# Patient Record
Sex: FEMALE | Race: BLACK OR AFRICAN AMERICAN | NOT HISPANIC OR LATINO
[De-identification: names, ages, dates, MRNs, and addresses within clinical notes are randomized per-mention and may not be internally consistent; named-entity substitution may affect disease eponyms.]

---

## 2019-03-29 ENCOUNTER — APPOINTMENT (OUTPATIENT)
Dept: DERMATOLOGY | Facility: CLINIC | Age: 34
End: 2019-03-29
Payer: COMMERCIAL

## 2019-03-29 DIAGNOSIS — Z91.89 OTHER SPECIFIED PERSONAL RISK FACTORS, NOT ELSEWHERE CLASSIFIED: ICD-10-CM

## 2019-03-29 DIAGNOSIS — L83 ACANTHOSIS NIGRICANS: ICD-10-CM

## 2019-03-29 PROCEDURE — 99203 OFFICE O/P NEW LOW 30 MIN: CPT

## 2019-03-29 RX ORDER — MINOCYCLINE HYDROCHLORIDE 100 MG/1
100 CAPSULE ORAL TWICE DAILY
Qty: 60 | Refills: 1 | Status: ACTIVE | COMMUNITY
Start: 2019-03-29 | End: 1900-01-01

## 2019-03-29 RX ORDER — KETOCONAZOLE 20.5 MG/ML
2 SHAMPOO, SUSPENSION TOPICAL
Qty: 1 | Refills: 11 | Status: ACTIVE | COMMUNITY
Start: 2019-03-29 | End: 1900-01-01

## 2019-05-30 ENCOUNTER — APPOINTMENT (OUTPATIENT)
Dept: DERMATOLOGY | Facility: CLINIC | Age: 34
End: 2019-05-30

## 2020-01-02 ENCOUNTER — EMERGENCY (EMERGENCY)
Facility: HOSPITAL | Age: 35
LOS: 0 days | Discharge: HOME | End: 2020-01-02
Payer: COMMERCIAL

## 2020-01-02 VITALS
WEIGHT: 145.06 LBS | RESPIRATION RATE: 18 BRPM | HEIGHT: 62 IN | SYSTOLIC BLOOD PRESSURE: 140 MMHG | OXYGEN SATURATION: 99 % | TEMPERATURE: 100 F | DIASTOLIC BLOOD PRESSURE: 88 MMHG | HEART RATE: 92 BPM

## 2020-01-02 DIAGNOSIS — J06.9 ACUTE UPPER RESPIRATORY INFECTION, UNSPECIFIED: ICD-10-CM

## 2020-01-02 DIAGNOSIS — R05 COUGH: ICD-10-CM

## 2020-01-02 PROCEDURE — 99283 EMERGENCY DEPT VISIT LOW MDM: CPT

## 2020-01-02 PROCEDURE — 71046 X-RAY EXAM CHEST 2 VIEWS: CPT | Mod: 26

## 2020-01-02 RX ORDER — ACETAMINOPHEN 500 MG
975 TABLET ORAL ONCE
Refills: 0 | Status: COMPLETED | OUTPATIENT
Start: 2020-01-02 | End: 2020-01-02

## 2020-01-02 RX ADMIN — Medication 975 MILLIGRAM(S): at 13:45

## 2020-01-02 NOTE — ED PROVIDER NOTE - CLINICAL SUMMARY MEDICAL DECISION MAKING FREE TEXT BOX
35 y/o F with flu like symptoms recently diagnosed with flu positive at an urgent care, treated but never had a CXR done as she would want, pt stated that cough improved a little but feels like its been there too long, no associated symptoms, NAD, VS low grade temp of 99.9 treated with tylenol, CXR negative pt was dc home with instructions to f/u with PMD.

## 2020-01-02 NOTE — ED PROVIDER NOTE - PATIENT PORTAL LINK FT
You can access the FollowMyHealth Patient Portal offered by [facility] by registering at the following website: [email or web address]. By joining [employer]’s FollowMyHealth portal, you will also be able to view your health information using other applications (apps) compatible with our system.

## 2020-01-02 NOTE — ED PROVIDER NOTE - OBJECTIVE STATEMENT
Pt is a 34 year old female w/no pertinent PMHx who presents to the ED today for bad cough, occasionally productive w/yellowish sputum, for about x1 week. Pt reports she was diagnosed w/the flu x10 days ago by an Urgent Care. Pt reports cough was present when she was diagnosed w/flu and states she did not have chest x-ray. Denies fever, HA, N/V/D, CP or SOB. No allergies to medications. Patient denies EtOH/tobacco/illicit substance use.

## 2020-01-02 NOTE — ED ADULT NURSE NOTE - OBJECTIVE STATEMENT
pt a&O x3 complaining of cough times 1 weeks. states she was diagnosed with flu and started on medications. pt denies N/V, body aches, congestion, fever pt states previous flu symptoms resolved but still has cough. denies any medical history.

## 2020-01-02 NOTE — ED ADULT TRIAGE NOTE - CHIEF COMPLAINT QUOTE
pt complaining of cough times 1 weeks. states she was diagnosed with flu and started on medications. other symptoms resolved but still has cough. denies any medical history.

## 2020-07-15 ENCOUNTER — APPOINTMENT (OUTPATIENT)
Dept: DERMATOLOGY | Facility: CLINIC | Age: 35
End: 2020-07-15

## 2021-02-11 ENCOUNTER — APPOINTMENT (OUTPATIENT)
Dept: HUMAN REPRODUCTION | Facility: CLINIC | Age: 36
End: 2021-02-11
Payer: COMMERCIAL

## 2021-02-11 PROCEDURE — 99072 ADDL SUPL MATRL&STAF TM PHE: CPT

## 2021-02-11 PROCEDURE — 76830 TRANSVAGINAL US NON-OB: CPT

## 2021-02-11 PROCEDURE — 36415 COLL VENOUS BLD VENIPUNCTURE: CPT

## 2021-02-11 PROCEDURE — 99214 OFFICE O/P EST MOD 30 MIN: CPT

## 2021-03-11 ENCOUNTER — APPOINTMENT (OUTPATIENT)
Dept: HUMAN REPRODUCTION | Facility: CLINIC | Age: 36
End: 2021-03-11

## 2021-03-13 ENCOUNTER — EMERGENCY (EMERGENCY)
Facility: HOSPITAL | Age: 36
LOS: 0 days | Discharge: HOME | End: 2021-03-13
Attending: STUDENT IN AN ORGANIZED HEALTH CARE EDUCATION/TRAINING PROGRAM
Payer: COMMERCIAL

## 2021-03-13 VITALS
TEMPERATURE: 98 F | OXYGEN SATURATION: 100 % | WEIGHT: 130.07 LBS | HEART RATE: 107 BPM | RESPIRATION RATE: 16 BRPM | HEIGHT: 62 IN

## 2021-03-13 VITALS
SYSTOLIC BLOOD PRESSURE: 110 MMHG | OXYGEN SATURATION: 99 % | DIASTOLIC BLOOD PRESSURE: 58 MMHG | TEMPERATURE: 98 F | HEART RATE: 78 BPM | RESPIRATION RATE: 19 BRPM
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
## 2021-03-13 DIAGNOSIS — M25.572 PAIN IN LEFT ANKLE AND JOINTS OF LEFT FOOT: ICD-10-CM

## 2021-03-13 DIAGNOSIS — S82.892A OTHER FRACTURE OF LEFT LOWER LEG, INITIAL ENCOUNTER FOR CLOSED FRACTURE: ICD-10-CM
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
## 2021-03-13 PROCEDURE — 99284 EMERGENCY DEPT VISIT MOD MDM: CPT

## 2021-03-13 PROCEDURE — 73590 X-RAY EXAM OF LOWER LEG: CPT | Mod: 26,LT,76

## 2021-03-13 PROCEDURE — 73610 X-RAY EXAM OF ANKLE: CPT | Mod: 26,LT

## 2021-03-13 RX ORDER — ACETAMINOPHEN 500 MG
975 TABLET ORAL ONCE
Refills: 0 | Status: COMPLETED | OUTPATIENT
Start: 2021-03-13 | End: 2021-03-13

## 2021-03-13 RX ORDER — OXYCODONE HYDROCHLORIDE 5 MG/1
5 TABLET ORAL ONCE
Refills: 0 | Status: DISCONTINUED | OUTPATIENT
Start: 2021-03-13 | End: 2021-03-13

## 2021-03-13 RX ADMIN — OXYCODONE HYDROCHLORIDE 5 MILLIGRAM(S): 5 TABLET ORAL at 05:55

## 2021-03-13 RX ADMIN — OXYCODONE HYDROCHLORIDE 5 MILLIGRAM(S): 5 TABLET ORAL at 06:30

## 2021-03-13 RX ADMIN — Medication 975 MILLIGRAM(S): at 05:55

## 2021-03-13 RX ADMIN — Medication 975 MILLIGRAM(S): at 05:25

## 2021-03-13 NOTE — ED PROVIDER NOTE - OBJECTIVE STATEMENT
35F denies pmhx says she was descending her staircase at home to pickup food delivery when she tripped, falling awkwardly onto her L ankle. Denies head trauma and LOC. Endorses acute 10/10 L ankle pain and swelling since the fall. Denies decreased sensation distal to the injury. Has not taken anything for the pain pta.

## 2021-03-13 NOTE — CONSULT NOTE ADULT - SUBJECTIVE AND OBJECTIVE BOX
Patient is a 35yFemale community ambulator without assistive devices who presents to [facility] ED w/ a c/o of Left ankle pain s/p fall at home. Patient states she was going down the stairs and missed the last step, twisted ankle and fell , denies any preceding CP/SOB/palpitations/headache/confusion/dizziness/weakness/fatigue. Denies Head trauma/LOC. States inability to walk immediately following the injury. Denies any numbness or tingling. Denies having any other pain elsewhere. Denies any previous orthopedic history. No other orthopedic concerns at this time.    No pertinent past medical history            No Known Allergies      PHYSICAL EXAM:  T(C): 36.8 (03-13-21 @ 05:03), Max: 36.8 (03-13-21 @ 05:03)  HR: 107 (03-13-21 @ 05:03) (107 - 107)  BP: 99/65 (03-13-21 @ 05:07) (99/65 - 99/65)  RR: 16 (03-13-21 @ 05:03) (16 - 16)  SpO2: 100% (03-13-21 @ 05:03) (100% - 100%)    Gen: NAD, Resting comfortably    LLE:  Skin intact, moderate swelling about the ankle, no erythema or ecchymosis  +bony tenderness to palpation over medial and lateral mall  +EHL/FHL/TA/GSC  +SILT L3-S1  + DP  Compartments soft and compressible  No calf tenderness    Secondary Survey:   No TTP over bony prominences, SILT, palpable pulses, full/painless A/PROM, compartments soft. No TTP over spinous processes or paraspinal muscles at C/T/L spine. No palpable step off. No other injuries or complaints.      Procedure:  Under aseptic conditions, a hematoma block was administered to the fracture site using 10cc of 1% lidocaine. Closed reduction was performed and a well molded, well padded plaster splint was applied. The patient tolerated the procedure well and there we no complications. The patient's post-reduction neurovascular exam was unchanged. Post-reduction xrays demonstrated acceptable alignment.        A/P: 35F w/ L [place] equivalent ankle fracture    Case discussed and reviewed w/ Dr Gardiner  Images showed L lateral mall fx and medial clear space widening on original imaging. No stress views needed  Ankle was reduced and placed in a trilam splint and post reduction images were done.   Signs and symptoms of compartment syndrome were explained to the patient  Pt understand to seek care at ED if encountered.   No immediate surgical intervention needed but pt aware they may need surgical intervention  Analgesia prn  NWB LLE in trilam splint  DVT ppx   PT/OT as tolerated, further recs in the office  Ice and elevate as tolerated  Orthopedically stable for discharge w/ plan to FU outpatient w/ Dr Gardiner, call office for apt.   Will discuss/Discussed with attending who is in agreement with above plan

## 2021-03-13 NOTE — ED PROVIDER NOTE - PATIENT PORTAL LINK FT
You can access the FollowMyHealth Patient Portal offered by [facility] by registering at the following website: [email or web address]. By joining [employer]’s FollowMyHealth portal, you will also be able to view your health information using other applications (apps) compatible with our system.

## 2021-03-13 NOTE — ED ADULT NURSE NOTE - NSIMPLEMENTINTERV_GEN_ALL_ED
Implemented All Fall Risk Interventions:  [place] to call system. Call bell, personal items and telephone within reach. Instruct patient to call for assistance. Room bathroom lighting operational. Non-slip footwear when patient is off stretcher. Physically safe environment: no spills, clutter or unnecessary equipment. Stretcher in lowest position, wheels locked, appropriate side rails in place. Provide visual cue, wrist band, yellow gown, etc. Monitor gait and stability. Monitor for mental status changes and reorient to person, place, and time. Review medications for side effects contributing to fall risk. Reinforce activity limits and safety measures with patient and family.

## 2021-03-13 NOTE — ED PROVIDER NOTE - WET READ LAUNCH FT
There are no Wet Read(s) to document. There are 2 Wet Read(s) to document. There are 4 Wet Read(s) to document.

## 2021-03-13 NOTE — ED PROVIDER NOTE - PROGRESS NOTE DETAILS
Pt was signed out to me by Dr. Livingston, awaiting ortho.  Ortho resident came to bedside, reduced and splinted, awaiting call back from ortho team with plan Ortho called back, states patient can be discharged to follow up with Dr. Gardiner outpatient within 5 days,.

## 2021-03-13 NOTE — ED PROVIDER NOTE - NSFOLLOWUPINSTRUCTIONS_ED_ALL_ED_FT
Please return to Emergency Department immediately for any new, concerning, or worsening symptoms.   Please follow-up with Orthopedics as recommended within 5 days

## 2021-03-13 NOTE — ED PROVIDER NOTE - CARE PROVIDER_API CALL
Matheus Gardiner (DO)  Orthopaedic Surgery  [address] [place] [address]  Phone: [phone]  Fax: [phone]  Follow Up Time: 1-3 Days

## 2021-03-13 NOTE — ED ADULT NURSE NOTE - OBJECTIVE STATEMENT
pt c/o L-ankle pain, s/p twisting it on steps x 30 minutes ago pt presents to the ED c/o left ankle pain, s/p walking down her staircase at home and twisting it going down the steps and landing on left ankle approx. 30 minutes ago. Pt denies head trauma, denies LOC. no motor/sensory deficits noted. swelling noted to left ankle.

## 2021-03-13 NOTE — ED PROVIDER NOTE - MUSCULOSKELETAL MINIMAL EXAM
L ankle, swollen, tender, sensation intact in foot distally, foot appears well perfused, DP and posterior tibial pulses palpated, seem 2+/RANGE OF MOTION LIMITED

## 2021-03-13 NOTE — ED PROVIDER NOTE - CLINICAL SUMMARY MEDICAL DECISION MAKING FREE TEXT BOX
35F no pmhx p/f acute L ankle pain in setting of mechanical slip&fall, L ankle circumferentially swollen, tender to palpation over medial and lateral malleoli, will obtain xrays and give pain meds, ddx sprain vs fracture, will involve ortho if bony injury present on xray.

## 2021-03-17 ENCOUNTER — OUTPATIENT (OUTPATIENT)
Dept: OUTPATIENT SERVICES | Facility: HOSPITAL | Age: 36
LOS: 1 days | End: 2021-03-17
Payer: COMMERCIAL

## 2021-03-17 VITALS
HEART RATE: 87 BPM | OXYGEN SATURATION: 98 % | DIASTOLIC BLOOD PRESSURE: 73 MMHG | SYSTOLIC BLOOD PRESSURE: 113 MMHG | HEIGHT: 61.5 IN | TEMPERATURE: 99 F | WEIGHT: 149.91 LBS | RESPIRATION RATE: 16 BRPM

## 2021-03-17 DIAGNOSIS — S82.62XA DISPLACED FRACTURE OF LATERAL MALLEOLUS OF LEFT FIBULA, INITIAL ENCOUNTER FOR CLOSED FRACTURE: ICD-10-CM

## 2021-03-17 DIAGNOSIS — Z01.818 ENCOUNTER FOR OTHER PREPROCEDURAL EXAMINATION: ICD-10-CM

## 2021-03-17 DIAGNOSIS — Z20.828 CONTACT WITH AND (SUSPECTED) EXPOSURE TO OTHER VIRAL COMMUNICABLE DISEASES: ICD-10-CM

## 2021-03-17 LAB
HCG SERPL-ACNC: <1 MIU/ML — SIGNIFICANT CHANGE UP
HCT VFR BLD CALC: 38.8 % — SIGNIFICANT CHANGE UP (ref 34.5–45)
HGB BLD-MCNC: 12.1 G/DL — SIGNIFICANT CHANGE UP (ref 11.5–15.5)
MCHC RBC-ENTMCNC: 29 PG — SIGNIFICANT CHANGE UP (ref 27–34)
MCHC RBC-ENTMCNC: 31.2 GM/DL — LOW (ref 32–36)
MCV RBC AUTO: 93 FL — SIGNIFICANT CHANGE UP (ref 80–100)
NRBC # BLD: 0 /100 WBCS — SIGNIFICANT CHANGE UP (ref 0–0)
PLATELET # BLD AUTO: 411 K/UL — HIGH (ref 150–400)
RBC # BLD: 4.17 M/UL — SIGNIFICANT CHANGE UP (ref 3.8–5.2)
RBC # FLD: 12.9 % — SIGNIFICANT CHANGE UP (ref 10.3–14.5)
SARS-COV-2 RNA SPEC QL NAA+PROBE: SIGNIFICANT CHANGE UP
WBC # BLD: 12.41 K/UL — HIGH (ref 3.8–10.5)
WBC # FLD AUTO: 12.41 K/UL — HIGH (ref 3.8–10.5)

## 2021-03-17 PROCEDURE — 84702 CHORIONIC GONADOTROPIN TEST: CPT

## 2021-03-17 PROCEDURE — G0463: CPT

## 2021-03-17 PROCEDURE — 85027 COMPLETE CBC AUTOMATED: CPT

## 2021-03-17 PROCEDURE — U0005: CPT

## 2021-03-17 PROCEDURE — U0003: CPT

## 2021-03-17 PROCEDURE — 36415 COLL VENOUS BLD VENIPUNCTURE: CPT

## 2021-03-17 NOTE — H&P PST ADULT - HISTORY OF PRESENT ILLNESS
34yo female patient who states that she sustained trauma to her LLE when she fell down steps in her home. She went to the ER at [facility] and an x-ray revealed a fracture. She was referred to Ortho and surgery was scheduled. She presents today for PSTs. She is having pain which she rates at 9/10. She is taking Ibuprofen and Tylenol with inadequate relief.

## 2021-03-17 NOTE — H&P PST ADULT - NSICDXPROBLEM_GEN_ALL_CORE_FT
PROBLEM DIAGNOSES  Problem: Closed displaced fracture of lateral malleolus of left fibula  Assessment and Plan: LEFT Open Reduction Internal Fixation Distal Fibular Fracture/Open Reduction Internal Fixation Distal Tibiofibular Disruption on 03/19/2021

## 2021-03-17 NOTE — H&P PST ADULT - ASSESSMENT
34yo female patient scheduled for surgery on 03/19/21. She will be NPO as per Anesthesia and will take no medication on AM of surgery. All pre-op instructions reviewed with patient. As per Covid19 Protocol, she will be screened for Covid19 today.

## 2021-03-18 ENCOUNTER — TRANSCRIPTION ENCOUNTER (OUTPATIENT)
Age: 36
End: 2021-03-18

## 2021-03-19 ENCOUNTER — RESULT REVIEW (OUTPATIENT)
Age: 36
End: 2021-03-19

## 2021-03-19 ENCOUNTER — OUTPATIENT (OUTPATIENT)
Dept: OUTPATIENT SERVICES | Facility: HOSPITAL | Age: 36
LOS: 1 days | End: 2021-03-19
Payer: COMMERCIAL

## 2021-03-19 VITALS
RESPIRATION RATE: 21 BRPM | DIASTOLIC BLOOD PRESSURE: 71 MMHG | OXYGEN SATURATION: 98 % | TEMPERATURE: 98 F | HEART RATE: 90 BPM | SYSTOLIC BLOOD PRESSURE: 121 MMHG | WEIGHT: 197.31 LBS | HEIGHT: 62 IN

## 2021-03-19 VITALS
OXYGEN SATURATION: 100 % | TEMPERATURE: 99 F | RESPIRATION RATE: 16 BRPM | DIASTOLIC BLOOD PRESSURE: 55 MMHG | HEART RATE: 95 BPM | SYSTOLIC BLOOD PRESSURE: 110 MMHG

## 2021-03-19 DIAGNOSIS — Z01.818 ENCOUNTER FOR OTHER PREPROCEDURAL EXAMINATION: ICD-10-CM

## 2021-03-19 DIAGNOSIS — S82.62XA DISPLACED FRACTURE OF LATERAL MALLEOLUS OF LEFT FIBULA, INITIAL ENCOUNTER FOR CLOSED FRACTURE: ICD-10-CM

## 2021-03-19 DIAGNOSIS — Z20.828 CONTACT WITH AND (SUSPECTED) EXPOSURE TO OTHER VIRAL COMMUNICABLE DISEASES: ICD-10-CM

## 2021-03-19 LAB — HCG SERPL-ACNC: <1 MIU/ML — SIGNIFICANT CHANGE UP

## 2021-03-19 PROCEDURE — 84702 CHORIONIC GONADOTROPIN TEST: CPT

## 2021-03-19 PROCEDURE — 88311 DECALCIFY TISSUE: CPT

## 2021-03-19 PROCEDURE — C1713: CPT

## 2021-03-19 PROCEDURE — 36415 COLL VENOUS BLD VENIPUNCTURE: CPT

## 2021-03-19 PROCEDURE — 27829 TREAT LOWER LEG JOINT: CPT | Mod: LT

## 2021-03-19 PROCEDURE — 88304 TISSUE EXAM BY PATHOLOGIST: CPT | Mod: 26

## 2021-03-19 PROCEDURE — 88311 DECALCIFY TISSUE: CPT | Mod: 26

## 2021-03-19 PROCEDURE — 88304 TISSUE EXAM BY PATHOLOGIST: CPT

## 2021-03-19 PROCEDURE — 76000 FLUOROSCOPY <1 HR PHYS/QHP: CPT

## 2021-03-19 PROCEDURE — 27792 TREATMENT OF ANKLE FRACTURE: CPT | Mod: LT

## 2021-03-19 RX ORDER — OXYCODONE HYDROCHLORIDE 5 MG/1
5 TABLET ORAL ONCE
Refills: 0 | Status: DISCONTINUED | OUTPATIENT
Start: 2021-03-19 | End: 2021-03-22

## 2021-03-19 RX ORDER — SODIUM CHLORIDE 9 MG/ML
1000 INJECTION, SOLUTION INTRAVENOUS
Refills: 0 | Status: DISCONTINUED | OUTPATIENT
Start: 2021-03-19 | End: 2021-03-22

## 2021-03-19 RX ORDER — ONDANSETRON 8 MG/1
4 TABLET, FILM COATED ORAL ONCE
Refills: 0 | Status: DISCONTINUED | OUTPATIENT
Start: 2021-03-19 | End: 2021-03-22

## 2021-03-19 RX ORDER — HYDROMORPHONE HYDROCHLORIDE 2 MG/ML
0.5 INJECTION INTRAMUSCULAR; INTRAVENOUS; SUBCUTANEOUS
Refills: 0 | Status: DISCONTINUED | OUTPATIENT
Start: 2021-03-19 | End: 2021-03-22

## 2021-03-19 RX ORDER — CEFAZOLIN SODIUM 1 G
2000 VIAL (EA) INJECTION ONCE
Refills: 0 | Status: COMPLETED | OUTPATIENT
Start: 2021-03-19 | End: 2021-03-19

## 2021-03-19 RX ADMIN — SODIUM CHLORIDE 75 MILLILITER(S): 9 INJECTION, SOLUTION INTRAVENOUS at 16:00

## 2021-03-19 NOTE — ASU DISCHARGE PLAN (ADULT/PEDIATRIC) - ASU DC SPECIAL INSTRUCTIONSFT
Elevate left lower extremity while in bed.  Non weight beaing left leg ambulate with crutches.  Keep surgical site covered and dry when showering.  Pain medication as prescribed.  Aspirin 325mg daily for blood clot preventiion and antibiotic as prescribed.  Follow up with Dr. Siegel in 2 weeks    For Constipation :   • Increase your water intake. Drink at least 8 glasses of water daily.  • Try adding fiber to your diet by eating fruits, vegetables and foods that are rich in grains.  • If you do experience constipation, you may take an over-the-counter stool softener/laxative such as Lakesha Colace, Senekot or  Milk of Magnesia. Elevate left lower extremity while in bed and for pain and swelling.  Non weight beaing left leg ambulate with crutches.  Keep surgical site covered and dry when showering.  Pain medication as prescribed.  Aspirin 325mg daily for blood clot preventiion and antibiotic as prescribed.  Follow up with Dr. Siegel in 2 weeks    For Constipation :   • Increase your water intake. Drink at least 8 glasses of water daily.  • Try adding fiber to your diet by eating fruits, vegetables and foods that are rich in grains.  • If you do experience constipation, you may take an over-the-counter stool softener/laxative such as Lakesha Colace, Senekot or  Milk of Magnesia.

## 2021-03-19 NOTE — ASU DISCHARGE PLAN (ADULT/PEDIATRIC) - CALL YOUR DOCTOR IF YOU HAVE ANY OF THE FOLLOWING:
Bleeding that does not stop/Swelling that gets worse/Pain not relieved by Medications/Fever greater than (need to indicate Fahrenheit or Celsius) Bleeding that does not stop/Swelling that gets worse/Pain not relieved by Medications/Fever greater than (need to indicate Fahrenheit or Celsius)/Wound/Surgical Site with redness, or foul smelling discharge or pus/Numbness, tingling, color or temperature change to extremity

## 2021-03-19 NOTE — ASU DISCHARGE PLAN (ADULT/PEDIATRIC) - ACTIVITY LEVEL
No excercise/No heavy lifting/No sports/gym/Elevate extremity/No tub baths No excercise/No heavy lifting/No sports/gym/No weight bearing/Elevate extremity/No tub baths

## 2021-03-19 NOTE — ASU DISCHARGE PLAN (ADULT/PEDIATRIC) - CARE PROVIDER_API CALL
Josh Siegel)  Orthopaedic Surgery  [address] [place] [address]  Phone: [phone]  Fax: [phone]  Follow Up Time: 2 weeks

## 2021-03-30 ENCOUNTER — APPOINTMENT (OUTPATIENT)
Dept: HUMAN REPRODUCTION | Facility: CLINIC | Age: 36
End: 2021-03-30

## 2021-04-02 PROBLEM — E55.9 VITAMIN D DEFICIENCY, UNSPECIFIED: Chronic | Status: ACTIVE | Noted: 2021-03-17

## 2021-04-09 ENCOUNTER — APPOINTMENT (OUTPATIENT)
Dept: HUMAN REPRODUCTION | Facility: CLINIC | Age: 36
End: 2021-04-09
Payer: COMMERCIAL

## 2021-04-09 ENCOUNTER — APPOINTMENT (OUTPATIENT)
Dept: RADIOLOGY | Facility: HOSPITAL | Age: 36
End: 2021-04-09

## 2021-04-09 ENCOUNTER — OUTPATIENT (OUTPATIENT)
Dept: OUTPATIENT SERVICES | Facility: HOSPITAL | Age: 36
LOS: 1 days | End: 2021-04-09
Payer: COMMERCIAL

## 2021-04-09 ENCOUNTER — RESULT REVIEW (OUTPATIENT)
Age: 36
End: 2021-04-09

## 2021-04-09 DIAGNOSIS — N97.1 FEMALE INFERTILITY OF TUBAL ORIGIN: ICD-10-CM

## 2021-04-09 PROCEDURE — 99214 OFFICE O/P EST MOD 30 MIN: CPT | Mod: 25

## 2021-04-09 PROCEDURE — 58340 CATHETER FOR HYSTEROGRAPHY: CPT

## 2021-04-09 PROCEDURE — 74740 X-RAY FEMALE GENITAL TRACT: CPT | Mod: 26,59

## 2021-04-09 PROCEDURE — 74740 X-RAY FEMALE GENITAL TRACT: CPT

## 2021-06-30 ENCOUNTER — ASOB RESULT (OUTPATIENT)
Age: 36
End: 2021-06-30

## 2021-06-30 ENCOUNTER — APPOINTMENT (OUTPATIENT)
Dept: ANTEPARTUM | Facility: CLINIC | Age: 36
End: 2021-06-30
Payer: COMMERCIAL

## 2021-06-30 PROCEDURE — 99072 ADDL SUPL MATRL&STAF TM PHE: CPT

## 2021-06-30 PROCEDURE — 36416 COLLJ CAPILLARY BLOOD SPEC: CPT

## 2021-06-30 PROCEDURE — 76801 OB US < 14 WKS SINGLE FETUS: CPT

## 2021-06-30 PROCEDURE — 76813 OB US NUCHAL MEAS 1 GEST: CPT

## 2021-07-20 NOTE — ED PROVIDER NOTE - SKIN, MLM
TPN bag changed, NPO w/ 1 cup of ice Q8H (1/2 cup being given Q4H), RUE precautions, voiding freely, states pain is 6/10, using Dilaudid PCA and scheduled Ofirmev for pain mgmt.  NGT to LIS putting out green/bile drainage, denies any n/v, abdomen is soft/di Skin normal color for race, warm, dry and intact. No evidence of rash.

## 2021-08-20 ENCOUNTER — ASOB RESULT (OUTPATIENT)
Age: 36
End: 2021-08-20

## 2021-08-20 ENCOUNTER — APPOINTMENT (OUTPATIENT)
Dept: ANTEPARTUM | Facility: CLINIC | Age: 36
End: 2021-08-20
Payer: COMMERCIAL

## 2021-08-20 PROCEDURE — 76811 OB US DETAILED SNGL FETUS: CPT

## 2021-12-15 ENCOUNTER — ASOB RESULT (OUTPATIENT)
Age: 36
End: 2021-12-15

## 2021-12-15 ENCOUNTER — INPATIENT (INPATIENT)
Facility: HOSPITAL | Age: 36
LOS: 2 days | Discharge: HOME | End: 2021-12-18
Attending: OBSTETRICS & GYNECOLOGY | Admitting: OBSTETRICS & GYNECOLOGY
Payer: COMMERCIAL

## 2021-12-15 ENCOUNTER — APPOINTMENT (OUTPATIENT)
Dept: ANTEPARTUM | Facility: CLINIC | Age: 36
End: 2021-12-15
Payer: COMMERCIAL

## 2021-12-15 VITALS — DIASTOLIC BLOOD PRESSURE: 72 MMHG | SYSTOLIC BLOOD PRESSURE: 109 MMHG | TEMPERATURE: 99 F | HEART RATE: 88 BPM

## 2021-12-15 DIAGNOSIS — Z98.890 OTHER SPECIFIED POSTPROCEDURAL STATES: Chronic | ICD-10-CM

## 2021-12-15 DIAGNOSIS — O36.5990 MATERNAL CARE FOR OTHER KNOWN OR SUSPECTED POOR FETAL GROWTH, UNSPECIFIED TRIMESTER, NOT APPLICABLE OR UNSPECIFIED: ICD-10-CM

## 2021-12-15 LAB
BASOPHILS # BLD AUTO: 0.03 K/UL — SIGNIFICANT CHANGE UP (ref 0–0.2)
BASOPHILS NFR BLD AUTO: 0.3 % — SIGNIFICANT CHANGE UP (ref 0–2)
EOSINOPHIL # BLD AUTO: 0.14 K/UL — SIGNIFICANT CHANGE UP (ref 0–0.5)
EOSINOPHIL NFR BLD AUTO: 1.2 % — SIGNIFICANT CHANGE UP (ref 0–6)
HCT VFR BLD CALC: 38.2 % — SIGNIFICANT CHANGE UP (ref 34.5–45)
HGB BLD-MCNC: 11.7 G/DL — SIGNIFICANT CHANGE UP (ref 11.5–15.5)
IANC: 7.77 K/UL — SIGNIFICANT CHANGE UP (ref 1.5–8.5)
IMM GRANULOCYTES NFR BLD AUTO: 1.1 % — SIGNIFICANT CHANGE UP (ref 0–1.5)
LYMPHOCYTES # BLD AUTO: 2.34 K/UL — SIGNIFICANT CHANGE UP (ref 1–3.3)
LYMPHOCYTES # BLD AUTO: 20.8 % — SIGNIFICANT CHANGE UP (ref 13–44)
MCHC RBC-ENTMCNC: 28.3 PG — SIGNIFICANT CHANGE UP (ref 27–34)
MCHC RBC-ENTMCNC: 30.6 GM/DL — LOW (ref 32–36)
MCV RBC AUTO: 92.5 FL — SIGNIFICANT CHANGE UP (ref 80–100)
MONOCYTES # BLD AUTO: 0.86 K/UL — SIGNIFICANT CHANGE UP (ref 0–0.9)
MONOCYTES NFR BLD AUTO: 7.6 % — SIGNIFICANT CHANGE UP (ref 2–14)
NEUTROPHILS # BLD AUTO: 7.77 K/UL — HIGH (ref 1.8–7.4)
NEUTROPHILS NFR BLD AUTO: 69 % — SIGNIFICANT CHANGE UP (ref 43–77)
NRBC # BLD: 0 /100 WBCS — SIGNIFICANT CHANGE UP
NRBC # FLD: 0 K/UL — SIGNIFICANT CHANGE UP
PLATELET # BLD AUTO: 280 K/UL — SIGNIFICANT CHANGE UP (ref 150–400)
RBC # BLD: 4.13 M/UL — SIGNIFICANT CHANGE UP (ref 3.8–5.2)
RBC # FLD: 12.9 % — SIGNIFICANT CHANGE UP (ref 10.3–14.5)
WBC # BLD: 11.26 K/UL — HIGH (ref 3.8–10.5)
WBC # FLD AUTO: 11.26 K/UL — HIGH (ref 3.8–10.5)

## 2021-12-15 PROCEDURE — 76816 OB US FOLLOW-UP PER FETUS: CPT

## 2021-12-15 PROCEDURE — 99212 OFFICE O/P EST SF 10 MIN: CPT | Mod: 25

## 2021-12-15 PROCEDURE — 76821 MIDDLE CEREBRAL ARTERY ECHO: CPT

## 2021-12-15 PROCEDURE — 76828 ECHO EXAM OF FETAL HEART: CPT

## 2021-12-15 PROCEDURE — 76820 UMBILICAL ARTERY ECHO: CPT

## 2021-12-15 PROCEDURE — 76818 FETAL BIOPHYS PROFILE W/NST: CPT | Mod: 26

## 2021-12-15 RX ORDER — CITRIC ACID/SODIUM CITRATE 300-500 MG
15 SOLUTION, ORAL ORAL EVERY 6 HOURS
Refills: 0 | Status: DISCONTINUED | OUTPATIENT
Start: 2021-12-15 | End: 2021-12-16

## 2021-12-15 RX ORDER — OXYTOCIN 10 UNIT/ML
333.33 VIAL (ML) INJECTION
Qty: 20 | Refills: 0 | Status: DISCONTINUED | OUTPATIENT
Start: 2021-12-15 | End: 2021-12-17

## 2021-12-15 RX ORDER — SODIUM CHLORIDE 9 MG/ML
1000 INJECTION, SOLUTION INTRAVENOUS
Refills: 0 | Status: DISCONTINUED | OUTPATIENT
Start: 2021-12-15 | End: 2021-12-16

## 2021-12-15 RX ADMIN — SODIUM CHLORIDE 125 MILLILITER(S): 9 INJECTION, SOLUTION INTRAVENOUS at 23:00

## 2021-12-15 NOTE — OB PROVIDER H&P - NSHPPHYSICALEXAM_GEN_ALL_CORE
T(C): --  HR: 88 (12-15-21 @ 22:06) (88 - 88)  BP: 109/72 (12-15-21 @ 22:06) (109/72 - 109/72)  RR: --  SpO2: --  Gen: NAD, well-appearing   Lungs: CTAB  Heart: RRR   Abd: Soft, gravid  SVE:  0/0/-3  Bedside sono: vertex  FHT: 140/mod gabriel/+acels/-decels  [place]: acontractile

## 2021-12-15 NOTE — OB RN PATIENT PROFILE - BREASTFEEDING MOTHER TAUGHT HOW TO HAND EXPRESS THEIR OWN MILK
Medication:   Requested Prescriptions     Pending Prescriptions Disp Refills    sertraline (ZOLOFT) 50 MG tablet [Pharmacy Med Name: Sertraline HCl Oral Tablet 50 MG] 30 tablet 0     Sig: TAKE 1 TABLET BY MOUTH EVERY DAY        Last Filled:   2/3/2021    Patient Phone Number: [phone] (home)     Last appt: 3/3/2021   Next appt: 9/3/2021    Last OARRS:   RX Monitoring 10/9/2017   Attestation The Prescription Monitoring Report for this patient was reviewed today. Periodic Controlled Substance Monitoring No signs of potential drug abuse or diversion identified. Statement Selected

## 2021-12-15 NOTE — OB PROVIDER H&P - ASSESSMENT
A/P: 36y  at 36.6 weeks GA presents to L&D for IOL for IUGR (<1%, elev dopp, AC<3%).  -Admit to L&D  -Consent  -Admission labs  -CLD   -IV fluids  -Labor: Intact Induce labor with PO cytotec  -Fetus: Cat I tracing. Continuous toco and fetal monitoring.   -GBS: Negative, no GBS ppx required   -Analgesia: epi PRN  -DVT ppx: Ambulate and SCD's while in bed     Discussed with Dr. Osorio Huff, PGY1

## 2021-12-15 NOTE — OB PROVIDER H&P - HISTORY OF PRESENT ILLNESS
36y  at 36.6 weeks GA presents to L&D for IOL for IUGR (<1%, elev dopp, AC<3%). Patient denies vaginal bleeding, contractions and leakage of fluid. She endorses good fetal movement. Denies fevers, chills, nausea and vomiting. No other complaints at this time. Prenatal course is significant for: IUGR.  BG:22      POB: TOP x2 s/p D&C x1  PGYN: small incidental fibroid dx 2 years ago, not found on early US; denies ovarian cysts, STD hx, or abnormal PAPs   PMH: Denies  PSH: Ankle Fracture Repair 3/2021; D+C  SH: Denies EtOH, tobacco and illicit drug use during this pregnancy; feels safe at home   Psych Hx: denies hx of anxiety or depression  Meds: PNVs, Iron  Allergies: NKDA    EFW: 1805g    GBS: neg      T(C): --  HR: 88 (12-15-21 @ 22:06) (88 - 88)  BP: 109/72 (-15-21 @ 22:06) (109/72 - 109/72)  RR: --  SpO2: --  Gen: NAD, well-appearing   Lungs: CTAB  Heart: RRR   Abd: Soft, gravid  SVE:    Bedside sono:  FHT:  [place]:           A/P: 36y  at 36.6 weeks GA presents to L&D for IOL for IUGR (<1%, elev dopp, AC<3%).  -Admit to L&D  -Consent  -Admission labs  -CLD   -IV fluids  -Labor: Intact Induce labor with ______.  -Fetus: Cat I tracing. Continuous toco and fetal monitoring.   -GBS: Negative, no GBS ppx required   -Analgesia: epi PRN  -DVT ppx: Ambulate and SCD's while in bed     Discussed with Dr. Antonietta Huff, PGY1 36y  at 36.6 weeks GA presents to L&D for IOL for IUGR (<1%, elev dopp, AC<3%). Patient denies vaginal bleeding, contractions and leakage of fluid. She endorses good fetal movement. Denies fevers, chills, nausea and vomiting. No other complaints at this time. Prenatal course is significant for: IUGR.  BG:22      POB: TOP x2 s/p D&C x1  PGYN: small incidental fibroid dx 2 years ago, not found on early US; denies ovarian cysts, STD hx, or abnormal PAPs   PMH: Denies  PSH: Ankle Fracture Repair 3/2021; D+C  SH: Denies EtOH, tobacco and illicit drug use during this pregnancy; feels safe at home   Psych Hx: denies hx of anxiety or depression  Meds: PNVs, Iron  Allergies: NKDA    EFW: 1805g    GBS: neg

## 2021-12-16 ENCOUNTER — RESULT REVIEW (OUTPATIENT)
Age: 36
End: 2021-12-16

## 2021-12-16 LAB
BLD GP AB SCN SERPL QL: NEGATIVE — SIGNIFICANT CHANGE UP
COVID-19 SPIKE DOMAIN AB INTERP: POSITIVE
COVID-19 SPIKE DOMAIN ANTIBODY RESULT: 96.3 U/ML — HIGH
RH IG SCN BLD-IMP: POSITIVE — SIGNIFICANT CHANGE UP
RH IG SCN BLD-IMP: POSITIVE — SIGNIFICANT CHANGE UP
SARS-COV-2 IGG+IGM SERPL QL IA: 96.3 U/ML — HIGH
SARS-COV-2 IGG+IGM SERPL QL IA: POSITIVE
SARS-COV-2 RNA SPEC QL NAA+PROBE: SIGNIFICANT CHANGE UP
T PALLIDUM AB TITR SER: NEGATIVE — SIGNIFICANT CHANGE UP

## 2021-12-16 PROCEDURE — 88307 TISSUE EXAM BY PATHOLOGIST: CPT | Mod: 26

## 2021-12-16 RX ORDER — DIBUCAINE 1 %
1 OINTMENT (GRAM) RECTAL EVERY 6 HOURS
Refills: 0 | Status: DISCONTINUED | OUTPATIENT
Start: 2021-12-16 | End: 2021-12-18

## 2021-12-16 RX ORDER — DIPHENHYDRAMINE HCL 50 MG
25 CAPSULE ORAL EVERY 6 HOURS
Refills: 0 | Status: DISCONTINUED | OUTPATIENT
Start: 2021-12-16 | End: 2021-12-18

## 2021-12-16 RX ORDER — OXYTOCIN 10 UNIT/ML
2 VIAL (ML) INJECTION
Qty: 30 | Refills: 0 | Status: DISCONTINUED | OUTPATIENT
Start: 2021-12-16 | End: 2021-12-17

## 2021-12-16 RX ORDER — OXYCODONE HYDROCHLORIDE 5 MG/1
5 TABLET ORAL
Refills: 0 | Status: DISCONTINUED | OUTPATIENT
Start: 2021-12-16 | End: 2021-12-18

## 2021-12-16 RX ORDER — SODIUM CHLORIDE 9 MG/ML
3 INJECTION INTRAMUSCULAR; INTRAVENOUS; SUBCUTANEOUS EVERY 8 HOURS
Refills: 0 | Status: DISCONTINUED | OUTPATIENT
Start: 2021-12-16 | End: 2021-12-18

## 2021-12-16 RX ORDER — ERTAPENEM SODIUM 1 G/1
1000 INJECTION, POWDER, LYOPHILIZED, FOR SOLUTION INTRAMUSCULAR; INTRAVENOUS ONCE
Refills: 0 | Status: COMPLETED | OUTPATIENT
Start: 2021-12-16 | End: 2021-12-16

## 2021-12-16 RX ORDER — BENZOCAINE 10 %
1 GEL (GRAM) MUCOUS MEMBRANE EVERY 6 HOURS
Refills: 0 | Status: DISCONTINUED | OUTPATIENT
Start: 2021-12-16 | End: 2021-12-18

## 2021-12-16 RX ORDER — ACETAMINOPHEN 500 MG
1000 TABLET ORAL ONCE
Refills: 0 | Status: COMPLETED | OUTPATIENT
Start: 2021-12-16 | End: 2021-12-16

## 2021-12-16 RX ORDER — PRAMOXINE HYDROCHLORIDE 150 MG/15G
1 AEROSOL, FOAM RECTAL EVERY 4 HOURS
Refills: 0 | Status: DISCONTINUED | OUTPATIENT
Start: 2021-12-16 | End: 2021-12-18

## 2021-12-16 RX ORDER — IBUPROFEN 200 MG
600 TABLET ORAL EVERY 6 HOURS
Refills: 0 | Status: COMPLETED | OUTPATIENT
Start: 2021-12-16 | End: 2022-11-14

## 2021-12-16 RX ORDER — OXYCODONE HYDROCHLORIDE 5 MG/1
5 TABLET ORAL ONCE
Refills: 0 | Status: DISCONTINUED | OUTPATIENT
Start: 2021-12-16 | End: 2021-12-18

## 2021-12-16 RX ORDER — ACETAMINOPHEN 500 MG
975 TABLET ORAL
Refills: 0 | Status: DISCONTINUED | OUTPATIENT
Start: 2021-12-16 | End: 2021-12-18

## 2021-12-16 RX ORDER — AER TRAVELER 0.5 G/1
1 SOLUTION RECTAL; TOPICAL EVERY 4 HOURS
Refills: 0 | Status: DISCONTINUED | OUTPATIENT
Start: 2021-12-16 | End: 2021-12-18

## 2021-12-16 RX ORDER — HYDROCORTISONE 1 %
1 OINTMENT (GRAM) TOPICAL EVERY 6 HOURS
Refills: 0 | Status: DISCONTINUED | OUTPATIENT
Start: 2021-12-16 | End: 2021-12-18

## 2021-12-16 RX ORDER — SIMETHICONE 80 MG/1
80 TABLET, CHEWABLE ORAL EVERY 4 HOURS
Refills: 0 | Status: DISCONTINUED | OUTPATIENT
Start: 2021-12-16 | End: 2021-12-18

## 2021-12-16 RX ORDER — LANOLIN
1 OINTMENT (GRAM) TOPICAL EVERY 6 HOURS
Refills: 0 | Status: DISCONTINUED | OUTPATIENT
Start: 2021-12-16 | End: 2021-12-18

## 2021-12-16 RX ORDER — KETOROLAC TROMETHAMINE 30 MG/ML
30 SYRINGE (ML) INJECTION ONCE
Refills: 0 | Status: DISCONTINUED | OUTPATIENT
Start: 2021-12-16 | End: 2021-12-17

## 2021-12-16 RX ORDER — TETANUS TOXOID, REDUCED DIPHTHERIA TOXOID AND ACELLULAR PERTUSSIS VACCINE, ADSORBED 5; 2.5; 8; 8; 2.5 [IU]/.5ML; [IU]/.5ML; UG/.5ML; UG/.5ML; UG/.5ML
0.5 SUSPENSION INTRAMUSCULAR ONCE
Refills: 0 | Status: DISCONTINUED | OUTPATIENT
Start: 2021-12-16 | End: 2021-12-18

## 2021-12-16 RX ORDER — OXYTOCIN 10 UNIT/ML
333.33 VIAL (ML) INJECTION
Qty: 20 | Refills: 0 | Status: DISCONTINUED | OUTPATIENT
Start: 2021-12-16 | End: 2021-12-17

## 2021-12-16 RX ORDER — MAGNESIUM HYDROXIDE 400 MG/1
30 TABLET, CHEWABLE ORAL
Refills: 0 | Status: DISCONTINUED | OUTPATIENT
Start: 2021-12-16 | End: 2021-12-18

## 2021-12-16 RX ADMIN — Medication 1000 MILLIUNIT(S)/MIN: at 21:45

## 2021-12-16 RX ADMIN — Medication 2 MILLIUNIT(S)/MIN: at 19:26

## 2021-12-16 RX ADMIN — ERTAPENEM SODIUM 120 MILLIGRAM(S): 1 INJECTION, POWDER, LYOPHILIZED, FOR SOLUTION INTRAMUSCULAR; INTRAVENOUS at 21:02

## 2021-12-16 RX ADMIN — Medication 400 MILLIGRAM(S): at 20:29

## 2021-12-16 RX ADMIN — SODIUM CHLORIDE 125 MILLILITER(S): 9 INJECTION, SOLUTION INTRAVENOUS at 05:01

## 2021-12-16 NOTE — OB RN DELIVERY SUMMARY - NSSELHIDDEN_OBGYN_ALL_OB_FT
[NS_DeliveryAttending1_OBGYN_ALL_OB_FT:[id number]],[NS_DeliveryRN_OBGYN_ALL_OB_FT:[id number]=]

## 2021-12-16 NOTE — OB PROVIDER DELIVERY SUMMARY - NSLACERATRAPAIRDETAILS_OBGYN_ALL_OB_FT
1st degree perineal laceration repaired with chromic suture with good hemostasis and reapproximation

## 2021-12-16 NOTE — OB PROVIDER LABOR PROGRESS NOTE - ASSESSMENT
P0 IOL for IUGR, ctx spaced out, no change on last exam.   Plan: start Pitocin to continue IOL, plan discussed with the patient and ob team  MD jose manuel
35 yo  @37wga IOL for IUGR <1% with elevated dopplers with CB in place    - CB placed at 2:30p  - c/w po cytotec  - expect     LEI Aquino, PGY1
SP SROM 730am  unchanged exam  Pt refuses all pain management option  Pt will consider cervical balloon and will let us know of decision  Continue PO cytotec    AMBER briseno
Patient examined s/p SROM  -continue PO  -continueFHR/toco    Discussed w Dr Janie Palmer, PGY-1
Patient examined s/p cervical balloon  -patient to move to the floor  -exp mgmt because tracing is not pitable now  -contniue FHR/daria  -patient beign flipped  -currently on O2  -fluid bolus given    Discussed w Dr. Janie Palmer, PGY-1

## 2021-12-16 NOTE — OB PROVIDER DELIVERY SUMMARY - NSPROVIDERDELIVERYNOTE_OBGYN_ALL_OB_FT
Patient is fully dilated and pushing with contractions. Head delivered in OA presentation, body followed easily with maternal pushing. Viable baby boy placed on maternal abdomen. Delayed cord clamping. Placenta delivered spontaneously and intact with 3 vessel cord and sent to pathology. APGARS  8/9. 1st degree perineal laceration repaired with chromic suture with good hemostasis and reapproximation.  cc. Baby and mother in good condition recovering in the room. Baby weight: 1765g. postop maternal temp  38.1. Plan: tylenol, antibiotics for suspected chorioamnionitis. Peds called to evaluate baby and transfer to NICU.  MD jose manuel

## 2021-12-16 NOTE — OB PROVIDER LABOR PROGRESS NOTE - NS_SUBJECTIVE/OBJECTIVE_OBGYN_ALL_OB_FT
Patient evaluated at bedside. No c/o
Patient examiend s/p SROM @ 730a    Vital Signs Last 24 Hrs  T(C): 36.9 (16 Dec 2021 07:02), Max: 37.3 (15 Dec 2021 22:06)  T(F): 98.42 (16 Dec 2021 07:02), Max: 99.14 (15 Dec 2021 22:06)  HR: 83 (16 Dec 2021 07:42) (78 - 88)  BP: 119/69 (16 Dec 2021 07:42) (107/58 - 119/69)  BP(mean): --  RR: 16 (15 Dec 2021 23:19) (16 - 16)  SpO2: --
Patient comfortable at bedside with epidural in place and amenable to cervical balloon.
Patient examined s/p CB
late entry due to clinical duties  Pt seen for increased pain

## 2021-12-16 NOTE — OB RN DELIVERY SUMMARY - NS_SEPSISRSKCALC_OBGYN_ALL_OB_FT
EOS calculated successfully. EOS Risk Factor: 0.5/1000 live births ([facility] national incidence); GA=37w;Temp=99.14; ROM=12.45; GBS='Negative'; Antibiotics='No antibiotics or any antibiotics < 2 hrs prior to birth'

## 2021-12-16 NOTE — PROGRESS NOTE ADULT - SUBJECTIVE AND OBJECTIVE BOX
Patient was seen and evaluated at bedside. H&P, labs, FHT, and notes reviewed.   35 yo P0 at 37 weeks, admitted for IOL last night with cytotec for IUGR ( <1%tile with elevated dopplers).hx of small asymptomatic uterine fibroid. VTX, 1 cm dilated on last exam. SROM, s/p epidural and presently comfortable. FHT cat 1.  Agrees for cervical balloon placement for IOL. Risks and benefits reviewed. Continue cytotec per protocol.  MD jose manuel

## 2021-12-16 NOTE — OB PROVIDER LABOR PROGRESS NOTE - NS_OBIHIFHRDETAILS_OBGYN_ALL_OB_FT
Cat I
135 and 140 with moderate variability
Discontinuous tracing, previously Cat 1: 130/mod variability/- accels/ - decels
140/mod/+accels/+decels
145/mod/+accels/+one deceleration

## 2021-12-17 ENCOUNTER — TRANSCRIPTION ENCOUNTER (OUTPATIENT)
Age: 36
End: 2021-12-17

## 2021-12-17 LAB
HCT VFR BLD CALC: 34.5 % — SIGNIFICANT CHANGE UP (ref 34.5–45)
HGB BLD-MCNC: 10.7 G/DL — LOW (ref 11.5–15.5)
MCHC RBC-ENTMCNC: 28.1 PG — SIGNIFICANT CHANGE UP (ref 27–34)
MCHC RBC-ENTMCNC: 31 GM/DL — LOW (ref 32–36)
MCV RBC AUTO: 90.6 FL — SIGNIFICANT CHANGE UP (ref 80–100)
NRBC # BLD: 0 /100 WBCS — SIGNIFICANT CHANGE UP
NRBC # FLD: 0 K/UL — SIGNIFICANT CHANGE UP
PLATELET # BLD AUTO: 248 K/UL — SIGNIFICANT CHANGE UP (ref 150–400)
RBC # BLD: 3.81 M/UL — SIGNIFICANT CHANGE UP (ref 3.8–5.2)
RBC # FLD: 12.8 % — SIGNIFICANT CHANGE UP (ref 10.3–14.5)
WBC # BLD: 19.24 K/UL — HIGH (ref 3.8–10.5)
WBC # FLD AUTO: 19.24 K/UL — HIGH (ref 3.8–10.5)

## 2021-12-17 RX ORDER — CHOLECALCIFEROL (VITAMIN D3) 125 MCG
6000 CAPSULE ORAL
Qty: 0 | Refills: 0 | DISCHARGE

## 2021-12-17 RX ORDER — IBUPROFEN 200 MG
1 TABLET ORAL
Qty: 0 | Refills: 0 | DISCHARGE
Start: 2021-12-17

## 2021-12-17 RX ORDER — IBUPROFEN 200 MG
600 TABLET ORAL EVERY 6 HOURS
Refills: 0 | Status: DISCONTINUED | OUTPATIENT
Start: 2021-12-17 | End: 2021-12-18

## 2021-12-17 RX ORDER — ACETAMINOPHEN 500 MG
3 TABLET ORAL
Qty: 0 | Refills: 0 | DISCHARGE
Start: 2021-12-17

## 2021-12-17 RX ADMIN — Medication 600 MILLIGRAM(S): at 11:53

## 2021-12-17 RX ADMIN — Medication 600 MILLIGRAM(S): at 18:01

## 2021-12-17 RX ADMIN — SODIUM CHLORIDE 3 MILLILITER(S): 9 INJECTION INTRAMUSCULAR; INTRAVENOUS; SUBCUTANEOUS at 22:00

## 2021-12-17 RX ADMIN — SODIUM CHLORIDE 3 MILLILITER(S): 9 INJECTION INTRAMUSCULAR; INTRAVENOUS; SUBCUTANEOUS at 06:16

## 2021-12-17 RX ADMIN — Medication 600 MILLIGRAM(S): at 12:30

## 2021-12-17 NOTE — PROGRESS NOTE ADULT - SUBJECTIVE AND OBJECTIVE BOX
Ppd#1    S: 37yo p1001 s/p  on 21 was iol for severe IUGR . Her pain is well controlled. She is tolerating a regular diet and passing flatus. Denies N/V. Denies CP/SOB/lightheadedness/dizziness.   She is ambulating without difficulty. no fever no chills  Voiding spontaneously.     O:   Vital Signs Last 24 Hrs  T(C): 37.2 (17 Dec 2021 09:56), Max: 38.1 (16 Dec 2021 20:17)  T(F): 98.9 (17 Dec 2021 09:56), Max: 100.6 (16 Dec 2021 20:17)  HR: 86 (17 Dec 2021 09:56) (67 - 142)  BP: 118/71 (17 Dec 2021 09:56) (93/53 - 159/76)  RR: 18 (17 Dec 2021 09:56) (18 - 18)  SpO2: 100% (17 Dec 2021 09:56) (89% - 100%)    Labs:  Blood type: O Positive  Rubella IgG: RPR: Negative                          11.7   11.26<H> >-----------< 280    ( 12-15 @ 23:49 )             38.2                  PE:  General: NAD  Abdomen: soft,uterus firm,no bleeding,no fundal tenderness  Extremities: No erythema, no pitting edema    A/P: 37yo p1001 s/p  at 37 wks ,iol for severe IUGR  Patient is stable and doing well   -chorio-s/p antibiotics last temp 100.6 on 21 at 8 pm  - Continue regular diet.  - Increase ambulation.  - Continue motrin, tylenol,  PRN for pain control  - FUP CBC  -possible dc home tomorrow

## 2021-12-17 NOTE — DISCHARGE NOTE OB - MEDICATION SUMMARY - MEDICATIONS TO TAKE
I will START or STAY ON the medications listed below when I get home from the hospital:    acetaminophen 325 mg oral tablet  -- 3 tab(s) by mouth every 6 hours, As Needed  -- Indication: For vaginal  delivery    ibuprofen 600 mg oral tablet  -- 1 tab(s) by mouth every 6 hours  -- Indication: For vaginal delivery

## 2021-12-17 NOTE — DISCHARGE NOTE OB - MATERIALS PROVIDED
Vaccinations/Breastfeeding Log/Breastfeeding Mother’s Support Group Information/Guide to Postpartum Care/Shaken Baby Prevention Handout/Breastfeeding Guide and Packet/Birth Certificate Instructions/Discharge Medication Information for Patients and Families Pocket Guide/Tdap Vaccination (VIS Pub Date: January 24, 2012)

## 2021-12-17 NOTE — DISCHARGE NOTE OB - HOSPITAL COURSE
patient admitted for iol due to severe IUGR,had ,developed chorio,got antibiotics  doing well PP  dc home on ppd#2  fup in 4-6 wks with her obgyn  precaution given

## 2021-12-17 NOTE — DISCHARGE NOTE OB - PATIENT PORTAL LINK FT
You can access the FollowMyHealth Patient Portal offered by [facility] by registering at the following website: [email or web address]. By joining [employer]’s FollowMyHealth portal, you will also be able to view your health information using other applications (apps) compatible with our system.

## 2021-12-17 NOTE — DISCHARGE NOTE OB - NS MD DC FALL RISK RISK
For information on Fall & Injury Prevention, visit: [email or web address] OR  [email or web address] OR  https://www.cdc.gov/steadi/patient.html

## 2021-12-17 NOTE — DISCHARGE NOTE OB - PLAN OF CARE
s/p  was iol for severe IUGR,had chroio now afebrilefor 16 hrs  doing well pp encourage ambulating and breastfeeding  dc home if stable  fup in 4-6 wks

## 2021-12-17 NOTE — DISCHARGE NOTE OB - CARE PROVIDER_API CALL
Martin Arauz)  Obstetrics and Gynecology  [address] [place] [address]  Phone: [phone]  Fax: [phone]  Follow Up Time:

## 2021-12-18 VITALS
TEMPERATURE: 99 F | OXYGEN SATURATION: 98 % | HEART RATE: 94 BPM | DIASTOLIC BLOOD PRESSURE: 63 MMHG | RESPIRATION RATE: 17 BRPM | SYSTOLIC BLOOD PRESSURE: 119 MMHG

## 2021-12-18 LAB
BASOPHILS # BLD AUTO: 0.04 K/UL — SIGNIFICANT CHANGE UP (ref 0–0.2)
BASOPHILS NFR BLD AUTO: 0.3 % — SIGNIFICANT CHANGE UP (ref 0–2)
EOSINOPHIL # BLD AUTO: 0.33 K/UL — SIGNIFICANT CHANGE UP (ref 0–0.5)
EOSINOPHIL NFR BLD AUTO: 2.2 % — SIGNIFICANT CHANGE UP (ref 0–6)
HCT VFR BLD CALC: 31.2 % — LOW (ref 34.5–45)
HGB BLD-MCNC: 9.7 G/DL — LOW (ref 11.5–15.5)
IANC: 10.2 K/UL — HIGH (ref 1.5–8.5)
IMM GRANULOCYTES NFR BLD AUTO: 1.1 % — SIGNIFICANT CHANGE UP (ref 0–1.5)
LYMPHOCYTES # BLD AUTO: 21.6 % — SIGNIFICANT CHANGE UP (ref 13–44)
LYMPHOCYTES # BLD AUTO: 3.28 K/UL — SIGNIFICANT CHANGE UP (ref 1–3.3)
MCHC RBC-ENTMCNC: 29.1 PG — SIGNIFICANT CHANGE UP (ref 27–34)
MCHC RBC-ENTMCNC: 31.1 GM/DL — LOW (ref 32–36)
MCV RBC AUTO: 93.7 FL — SIGNIFICANT CHANGE UP (ref 80–100)
MONOCYTES # BLD AUTO: 1.19 K/UL — HIGH (ref 0–0.9)
MONOCYTES NFR BLD AUTO: 7.8 % — SIGNIFICANT CHANGE UP (ref 2–14)
NEUTROPHILS # BLD AUTO: 10.2 K/UL — HIGH (ref 1.8–7.4)
NEUTROPHILS NFR BLD AUTO: 67 % — SIGNIFICANT CHANGE UP (ref 43–77)
NRBC # BLD: 0 /100 WBCS — SIGNIFICANT CHANGE UP
NRBC # FLD: 0 K/UL — SIGNIFICANT CHANGE UP
PLATELET # BLD AUTO: 225 K/UL — SIGNIFICANT CHANGE UP (ref 150–400)
RBC # BLD: 3.33 M/UL — LOW (ref 3.8–5.2)
RBC # FLD: 13 % — SIGNIFICANT CHANGE UP (ref 10.3–14.5)
WBC # BLD: 15.2 K/UL — HIGH (ref 3.8–10.5)
WBC # FLD AUTO: 15.2 K/UL — HIGH (ref 3.8–10.5)

## 2021-12-18 RX ADMIN — SODIUM CHLORIDE 3 MILLILITER(S): 9 INJECTION INTRAMUSCULAR; INTRAVENOUS; SUBCUTANEOUS at 06:14

## 2021-12-18 NOTE — PROGRESS NOTE ADULT - SUBJECTIVE AND OBJECTIVE BOX
Ppd#2    S: 37yo p1001 s/p  on 21 was iol for severe IUGR baby in NICU . Her pain is well controlled. She is tolerating a regular diet and passing flatus. Denies N/V. Denies CP/SOB/lightheadedness/dizziness.   She is ambulating without difficulty. no fever no chills  Voiding spontaneously.     O:   ICU Vital Signs Last 24 Hrs  T(C): 36.6 (18 Dec 2021 06:30), Max: 36.8 (17 Dec 2021 18:10)  T(F): 97.9 (18 Dec 2021 06:30), Max: 98.2 (17 Dec 2021 18:10)  HR: 72 (18 Dec 2021 06:30) (72 - 97)  BP: 112/62 (18 Dec 2021 06:30) (103/60 - 123/64)  RR: 18 (18 Dec 2021 06:30) (16 - 18)  SpO2: 99% (18 Dec 2021 06:30) (90% - 100%)                          9.7    15.20 )-----------( 225      ( 18 Dec 2021 06:31 )             31.2                 PE:  General: NAD  Abdomen: soft,uterus firm,no bleeding,no fundal tenderness  Extremities: No erythema, no pitting edema    A/P: 37yo p1001 s/p  at 37 wks ,iol for severe IUGR  Patient is stable and doing well   -chorio-s/p antibiotics last temp 100.6 on 21 at 8 pm,afberbile >24 hrs  - Continue regular diet.  - Increase ambulation.  - Continue motrin, tylenol,  PRN for pain control  -dc home tomorrow     Ppd#2    S: 37yo p1001 s/p  on 21 was iol for severe IUGR baby in NICU . Her pain is well controlled. She is tolerating a regular diet and passing flatus. Denies N/V. Denies CP/SOB/lightheadedness/dizziness.   She is ambulating without difficulty. no fever no chills  Voiding spontaneously.     O:   ICU Vital Signs Last 24 Hrs  T(C): 36.6 (18 Dec 2021 06:30), Max: 36.8 (17 Dec 2021 18:10)  T(F): 97.9 (18 Dec 2021 06:30), Max: 98.2 (17 Dec 2021 18:10)  HR: 72 (18 Dec 2021 06:30) (72 - 97)  BP: 112/62 (18 Dec 2021 06:30) (103/60 - 123/64)  RR: 18 (18 Dec 2021 06:30) (16 - 18)  SpO2: 99% (18 Dec 2021 06:30) (90% - 100%)                          9.7    15.20 )-----------( 225      ( 18 Dec 2021 06:31 )             31.2                 PE:  General: NAD  Abdomen: soft,uterus firm,no bleeding,no fundal tenderness  Extremities: No erythema, no pitting edema    A/P: 37yo p1001 s/p  at 37 wks ,iol for severe IUGR  Patient is stable and doing well   -chorio-s/p antibiotics last temp 100.6 on 21 at 8 pm,afberbile >24 hrs  - Continue regular diet.  - Increase ambulation.  - Continue motrin, tylenol,  PRN for pain control  -baby in nicu still  -patient want to stay one more day with baby(advised about insurance coverage and told may receive a bill,she verbalized understanding)  -dc home tomorrow

## 2022-01-05 LAB — SURGICAL PATHOLOGY STUDY: SIGNIFICANT CHANGE UP

## 2022-07-13 ENCOUNTER — APPOINTMENT (OUTPATIENT)
Dept: DERMATOLOGY | Facility: CLINIC | Age: 37
End: 2022-07-13

## 2023-05-15 NOTE — ED ADULT TRIAGE NOTE - LOCATION:
Left arm; Left ankle DF contracture/bilateral upper extremity Active ROM was WFL (within functional limits)/bilateral  lower extremity Active ROM was WFL (within functional limits)/deficits as listed below

## 2024-02-03 NOTE — ED ADULT NURSE NOTE - NS_SISCREENINGSR_GEN_ALL_ED
Report received from Rn. Assumed pt care. Pt is resting in bed on 2 L 02. Pt A&O x 4. Fall precautions in place, call light and belongings within reach, bed in lowest position. No signs of distress.    Negative

## 2024-02-07 ENCOUNTER — EMERGENCY (EMERGENCY)
Facility: HOSPITAL | Age: 39
LOS: 1 days | Discharge: LEFT BEFORE DISCHARGE | End: 2024-02-07
Admitting: EMERGENCY MEDICINE
Payer: COMMERCIAL

## 2024-02-07 VITALS
RESPIRATION RATE: 17 BRPM | HEART RATE: 94 BPM | WEIGHT: 151.02 LBS | DIASTOLIC BLOOD PRESSURE: 85 MMHG | OXYGEN SATURATION: 97 % | SYSTOLIC BLOOD PRESSURE: 125 MMHG | TEMPERATURE: 97 F

## 2024-02-07 DIAGNOSIS — Z98.890 OTHER SPECIFIED POSTPROCEDURAL STATES: Chronic | ICD-10-CM

## 2024-02-07 PROCEDURE — L9991: CPT

## 2024-02-07 NOTE — ED ADULT TRIAGE NOTE - CHIEF COMPLAINT QUOTE
sent in from OBGYN for ectopic pregnancy. Unsure how many weeks. Denies vaginal/bleeding spotting. LMP "end of january" asymptomatic at this time hx.

## 2024-02-07 NOTE — ED ADULT TRIAGE NOTE - PATIENT ON (OXYGEN DELIVERY METHOD)
room air
DISPLAY PLAN FREE TEXT

## 2024-02-07 NOTE — ED ADULT TRIAGE NOTE - CCCP TRG CHIEF CMPLNT
abdominal pain Tetracycline Pregnancy And Lactation Text: This medication is Pregnancy Category D and not consider safe during pregnancy. It is also excreted in breast milk.

## 2024-02-08 NOTE — ED POST DISCHARGE NOTE - ADDITIONAL DOCUMENTATION
Attempted to call patient as she LWOBE w/ triage note stating that she has ectopic pregnancy. No answer. Voicemail left.. [phone]

## 2024-04-21 NOTE — OB RN PATIENT PROFILE - NS MD HP INPLANTS MED DEV
Turned over Kit #[id number] & bag of evidence to [facility]IT[facility].  Chain of custody maintained.     None

## 2024-06-12 NOTE — OB RN PATIENT PROFILE - INFANT HOME WITH MOTHER, OB PROFILE
Start the Valtrex as prescribed finish it completely you may do over-the-counter Abreva or Vaseline just to moisten the lip.  If there is pus or drainage from the skin severe swelling fever or chills or any new or worsening symptoms return to the [facility] or go the nearest emergency department otherwise follow-up with your primary care provider.  
yes

## 2024-11-20 ENCOUNTER — APPOINTMENT (OUTPATIENT)
Dept: DERMATOLOGY | Facility: CLINIC | Age: 39
End: 2024-11-20

## 2024-12-17 NOTE — ED ADULT TRIAGE NOTE - PAIN: PRESENCE, MLM
Name: Pretty Aguila      : 1951      MRN: [id number]  Encounter Provider: Lauren Dumont MD  Encounter Date: 2024   Encounter department: [facility]  TCM Call     Date and time call was made  2024  5:06 PM    Hospital care reviewed  Records reviewed    Patient was hospitialized at  [facility]    Date of Admission  24    Date of discharge  24    Diagnosis  Pancytopenia (HCC)    Disposition  Home    Were the patients medications reviewed and updated  Yes    Current Symptoms  None      TCM Call     Scheduled for follow up?  Yes    Did you obtain your prescribed medications  Yes    Do you need help managing your prescriptions or medications  No    Is transportation to your appointment needed  No    I have advised the patient to call PCP with any new or worsening symptoms  Christina Mullins MA        :  Assessment & Plan  Essential hypertension  Has been running very low will decrease to metoprolol 25XLqd  and continue monitoring at home    Orders:  •  metoprolol succinate (Toprol XL) 25 mg 24 hr tablet; Take 1 tablet (25 mg total) by mouth daily    Dyslipidemia  Cont meds        Coronary artery disease involving native coronary artery of native heart without angina pectoris  No chest pain now       Anxiety and depression  Ok on meds     Orders:  •  citalopram (CeleXA) 20 mg tablet; Take 1 tablet (20 mg total) by mouth daily    Pancytopenia (HCC)  Has appt with hematology  pt will be  unable to work will give STD for 2 months   24 -25 rtw 25  Orders:  •  CBC and differential; Future    Encounter for immunization    Orders:  •  influenza vaccine, high-dose, PF 0.5 mL (Fluzone High Dose)    Dyspnea on exertion  Significant anemia has followup  with hematology in 2 weeks               History of Present Illness     Pt here for TCM was hospitalize with dizziness foud to have pancytopenia now being followed by hematology has appt  pt  "has been getting low bp readings at home 90's systolic       Review of Systems   Constitutional:  Negative for appetite change, chills, fatigue and fever.   Respiratory:  Negative for cough, chest tightness and shortness of breath.    Cardiovascular:  Negative for chest pain, palpitations and leg swelling.   Gastrointestinal:  Negative for abdominal pain, constipation, diarrhea, nausea and vomiting.   Genitourinary:  Negative for difficulty urinating and frequency.   Musculoskeletal:  Negative for arthralgias, back pain, gait problem and neck pain.   Skin:  Negative for rash.   Neurological:  Positive for light-headedness. Negative for dizziness, weakness, numbness and headaches.   Hematological:  Does not bruise/bleed easily.   Psychiatric/Behavioral:  Negative for dysphoric mood and sleep disturbance. The patient is not nervous/anxious.        Objective   /64 (BP Location: Left arm, Patient Position: Sitting, Cuff Size: Standard)   Pulse 92   Temp 97.8 °F (36.6 °C) (Tympanic)   Resp 16   Ht 5' 3\" (1.6 m)   Wt 83 kg (183 lb)   SpO2 99%   BMI 32.42 kg/m²      Physical Exam  Constitutional:       General: She is not in acute distress.     Appearance: Normal appearance. She is obese.   Neck:      Thyroid: No thyromegaly.      Vascular: No carotid bruit.   Cardiovascular:      Rate and Rhythm: Normal rate and regular rhythm.      Heart sounds: Normal heart sounds. No murmur heard.  Pulmonary:      Effort: Pulmonary effort is normal. No respiratory distress.      Breath sounds: Normal breath sounds. No wheezing, rhonchi or rales.   Abdominal:      Palpations: Abdomen is soft.      Tenderness: There is no abdominal tenderness.   Musculoskeletal:      Cervical back: Normal range of motion and neck supple.      Right lower leg: No edema.      Left lower leg: No edema.   Skin:     Findings: No rash.   Neurological:      General: No focal deficit present.      Mental Status: She is alert and oriented to person, " place, and time. Mental status is at baseline.      Gait: Gait normal.   Psychiatric:         Mood and Affect: Mood normal.         Behavior: Behavior normal.          complains of pain/discomfort

## 2025-09-03 ENCOUNTER — APPOINTMENT (OUTPATIENT)
Dept: DERMATOLOGY | Facility: CLINIC | Age: 40
End: 2025-09-03

## 2025-09-11 ENCOUNTER — APPOINTMENT (OUTPATIENT)
Dept: DERMATOLOGY | Facility: CLINIC | Age: 40
End: 2025-09-11

## 2025-09-17 ENCOUNTER — APPOINTMENT (OUTPATIENT)
Dept: DERMATOLOGY | Facility: CLINIC | Age: 40
End: 2025-09-17